# Patient Record
Sex: MALE | Race: WHITE | NOT HISPANIC OR LATINO | Employment: FULL TIME | URBAN - METROPOLITAN AREA
[De-identification: names, ages, dates, MRNs, and addresses within clinical notes are randomized per-mention and may not be internally consistent; named-entity substitution may affect disease eponyms.]

---

## 2018-01-01 ENCOUNTER — HOSPITAL ENCOUNTER (EMERGENCY)
Facility: CLINIC | Age: 23
Discharge: HOME | End: 2018-01-01
Attending: EMERGENCY MEDICINE | Admitting: EMERGENCY MEDICINE

## 2018-01-01 ENCOUNTER — APPOINTMENT (OUTPATIENT)
Dept: GENERAL RADIOLOGY | Facility: CLINIC | Age: 23
End: 2018-01-01
Attending: EMERGENCY MEDICINE

## 2018-01-01 VITALS
DIASTOLIC BLOOD PRESSURE: 92 MMHG | HEIGHT: 70 IN | WEIGHT: 170 LBS | OXYGEN SATURATION: 99 % | SYSTOLIC BLOOD PRESSURE: 136 MMHG | HEART RATE: 86 BPM | BODY MASS INDEX: 24.34 KG/M2 | TEMPERATURE: 100 F | RESPIRATION RATE: 16 BRPM

## 2018-01-01 DIAGNOSIS — S62.356A CLOSED NONDISPLACED FRACTURE OF SHAFT OF FIFTH METACARPAL BONE OF RIGHT HAND, INITIAL ENCOUNTER: ICD-10-CM

## 2018-01-01 PROCEDURE — 26600 TREAT METACARPAL FRACTURE: CPT | Mod: RT

## 2018-01-01 PROCEDURE — 99284 EMERGENCY DEPT VISIT MOD MDM: CPT | Mod: 25

## 2018-01-01 PROCEDURE — 73130 X-RAY EXAM OF HAND: CPT | Mod: RT

## 2018-01-01 ASSESSMENT — ENCOUNTER SYMPTOMS
WOUND: 0
MYALGIAS: 1
NECK STIFFNESS: 0
WEAKNESS: 0
DIZZINESS: 0
NECK PAIN: 0
JOINT SWELLING: 1
NUMBNESS: 0
ARTHRALGIAS: 1
BACK PAIN: 0
COLOR CHANGE: 0

## 2018-01-01 NOTE — ED PROVIDER NOTES
"  History     Chief Complaint:  Hand Injury    HPI   Cristobal Serna is a 22 year old right hand dominant male who presents with a hand injury.  The patient reports that he was working out yesterday, punching a punching bag, and in doing so injured his right hand.  He states that this pain came on specifically after one punch, and he stopped exercising after this pain occurred.  He states that he thought this pain would go away, but as the pain and swelling have increased over the past 24 hours, he decided to be evaluated today.  He has not taken anything for pain at home prior to arrival.  He denies any other injuries, open wounds, noticeable deformities, numbness, tingling, weakness, or any other associated symptoms.    Allergies:  No known drug allergies.    Medications:    The patient is currently on no regular medications.    Past Medical History:    No significant past medical history.     Past Surgical History:    No pertinent past surgical history.    Family History:    No pertinent family history.    Social History:  Smoking status: Former smoker  Alcohol use: Yes  Marital Status:  Single      Review of Systems   Musculoskeletal: Positive for arthralgias, joint swelling and myalgias. Negative for back pain, gait problem, neck pain and neck stiffness.   Skin: Negative for color change and wound.   Neurological: Negative for dizziness, weakness and numbness.   All other systems reviewed and are negative.    Physical Exam     Patient Vitals for the past 24 hrs:   BP Temp Temp src Pulse Resp SpO2 Height Weight   01/01/18 1047 (!) 136/92 100  F (37.8  C) Temporal 86 16 99 % 1.768 m (5' 9.6\") 77.1 kg (170 lb)         Physical Exam  Nursing note and vitals reviewed.  Constitutional:  Oriented to person, place, and time. Cooperative.   HENT:   Nose:    Nose normal.   Mouth/Throat:   Mucous membranes are normal.   Eyes:    Conjunctivae normal and EOM are normal.      Pupils are equal, round, and reactive to light. "   Neck:    Trachea normal.   Cardiovascular:  Normal rate, regular rhythm, normal heart sounds and normal pulses. No murmur heard.  Pulmonary/Chest:  Effort normal and breath sounds normal.   Abdominal:   Soft. Normal appearance and bowel sounds are normal.      There is no tenderness.      There is no rebound and no CVA tenderness.   Musculoskeletal:  Right hand swelling and tenderness present mainly over the 5th metacarpal. Tenderness with axial loading of the 5th digit as well. Extremities otherwise atraumatic.  Lymphadenopathy:  No cervical adenopathy.   Neurological:   Alert and oriented to person, place, and time. Normal strength.      No cranial nerve deficit or sensory deficit. GCS eye subscore is 4. GCS verbal subscore is 5. GCS motor subscore is 6.   Skin:    Skin is intact. No rash noted.   Psychiatric:   Normal mood and affect.    Emergency Department Course   Imaging:  Hand XR, G/E 3 views, right:  IMPRESSION: There is a minimally displaced fracture through the  proximal metaphysis of the right fifth metacarpal. No other fractures.  Report per radiology.    Procedures:   Splint Placement    PLACEMENT: Custom Ulnar Gutter splint was applied to the right upper extremity and after placement I checked and adjusted the fit to ensure proper positioning. The patient was more comfortable with the splint in place. Sensation and circulation are intact after splint placement.      Emergency Department Course:  Nursing notes and vitals reviewed.  (5715) I performed an exam of the patient as documented above.    The patient was sent for a x-ray while in the emergency department, findings above.   Patient was placed in a splint, see above note.    Findings and plan explained to the patient. Patient discharged home with instructions regarding supportive care, medications, and reasons to return. The importance of close follow-up was reviewed.   Impression & Plan    Medical Decision Making:  Cristobal Serna is a 22  year old male who came in with a right hand injury after punching a punching bag. He did not want anything for pain. He had x-rays obtained which showed above fracture. He was then placed in a plaster ulnar gutter splint. I will have him follow up with [facility]. I left a voicemail on the [facility] hotline with the patient's contact information.      Diagnosis:    ICD-10-CM   1. Closed nondisplaced fracture of shaft of fifth metacarpal bone of right hand, initial encounter S62.356A       Disposition:  Patient is discharged to home.      I, Michael Mcginnis, am serving as a scribe on 1/1/2018 at 10:56 AM to personally document services performed by Dr. Mendes based on my observations and the provider's statements to me.       Michael Mcginnis  1/1/2018    EMERGENCY DEPARTMENT       Anoop Mendes MD  01/01/18 9142

## 2018-01-01 NOTE — DISCHARGE INSTRUCTIONS
Closed Hand Fracture (Adult)  You have a fracture, or broken bone, in your hand. This may be a small crack or chip in the bone. Or it may be a major break with the broken parts pushed out of place. A closed fracture means that the broken bone has not gone through the skin. A hand fracture is treated with a splint or cast. It usually takes 4 to 6 weeks to heal. Severe injuries may require surgery.     Home care    Keep your arm elevated to reduce pain and swelling. When sitting or lying down, elevate your arm above the level of your heart. You can do this by placing your arm on a pillow that rests on your chest or on a pillow at your side. This is most important during the first 48 hours after injury.    Apply an ice pack over the injured area for no more than 15 to 20 minutes. Do this every 1 to 2 hours for the first 24 to 48 hours. Continue with ice packs as needed to ease pain and swelling. To make an ice pack, put ice cubes in a plastic bag that seals at the top. Wrap the bag in a clean, thin towel or cloth. Never put ice or an ice pack directly on the skin. You can place the ice pack inside the sling and directly over the cast or splint. As the ice melts, be careful that the cast or splint doesn t get wet.    Keep the cast or splint completely dry at all times. Bathe with your cast or splint out of the water, protected with 2 large plastic bags. Place 1 bag outside the other. Tape each bag with duct tape at the top end. If a fiberglass cast or splint gets wet, dry it with a hair dryer on a cool setting.    You may use over-the-counter pain medicine to control pain, unless another pain medicine was prescribed. If you have chronic liver or kidney disease or ever had a stomach ulcer or GI bleeding, talk with your provider beforeusing these medicines.  Follow-up care  Follow up with your healthcare provider within 1 week, or as advised. This is to be sure the bone is healing properly. If you were given a splint,  it may be changed to a cast at your follow-up visit.  If X-rays were taken, you will be told of any new findings that may affect your care.  When to seek medical advice  Call your healthcare provider right away if any of these occur:    The plaster cast or splint becomes wet or soft    The fiberglass cast or splint stays wet for more than 24 hours    The cast has a bad smell    The plaster cast or splint becomes loose    There is increased tightness or pain under the cast or splint    The fingers on your injured hand become swollen, cold, blue, numb, or tingly  Date Last Reviewed: 12/3/2015    7004-9440 The "[employer]. [address] [place] [address]. All rights reserved. This information is not intended as a substitute for professional medical care. Always follow your healthcare professional's instructions.

## 2018-01-01 NOTE — ED AVS SNAPSHOT
Emergency Department    [address] [place] [address]    Phone:  [phone]    Fax:  [phone]                                       Cristobal Serna   MRN: [id number]    Department:   Emergency Department   Date of Visit:  1/1/2018           After Visit Summary Signature Page     I have received my discharge instructions, and my questions have been answered. I have discussed any challenges I see with this plan with the nurse or doctor.    ..........................................................................................................................................  Patient/Patient Representative Signature      ..........................................................................................................................................  Patient Representative Print Name and Relationship to Patient    ..................................................               ................................................  Date                                            Time    ..........................................................................................................................................  Reviewed by Signature/Title    ...................................................              ..............................................  Date                                                            Time

## 2018-01-01 NOTE — LETTER
January 1, 2018      To Whom It May Concern:      Cristobal Serna was seen in our Emergency Department today, 01/01/18.  He is unable to use his right hand until cleared by orthopedic surgery.    Sincerely,        Anoop Mendes MD

## 2018-01-01 NOTE — ED AVS SNAPSHOT
Emergency Department    [address] [facility] [address]    Phone:  [phone]    Fax:  [phone]                                       Cristobal Serna   MRN: [id number]    Department:   Emergency Department   Date of Visit:  1/1/2018           Patient Information     Date Of Birth          1995        Your diagnoses for this visit were:     Closed nondisplaced fracture of shaft of fifth metacarpal bone of right hand, initial encounter        You were seen by Anoop Mendes MD.      Follow-up Information     Schedule an appointment as soon as possible for a visit with Addis John MD.    Specialty:  Orthopedics    Contact information:    [facility]  [address]  [facility] [address]  [phone]          Follow up with  Emergency Department.    Specialty:  EMERGENCY MEDICINE    Why:  If symptoms worsen    Contact information:    [address] [facility] [address] [phone]        Discharge Instructions         Closed Hand Fracture (Adult)  You have a fracture, or broken bone, in your hand. This may be a small crack or chip in the bone. Or it may be a major break with the broken parts pushed out of place. A closed fracture means that the broken bone has not gone through the skin. A hand fracture is treated with a splint or cast. It usually takes 4 to 6 weeks to heal. Severe injuries may require surgery.     Home care    Keep your arm elevated to reduce pain and swelling. When sitting or lying down, elevate your arm above the level of your heart. You can do this by placing your arm on a pillow that rests on your chest or on a pillow at your side. This is most important during the first 48 hours after injury.    Apply an ice pack over the injured area for no more than 15 to 20 minutes. Do this every 1 to 2 hours for the first 24 to 48 hours. Continue with ice packs as needed to ease pain and swelling. To make an ice pack, put ice  cubes in a plastic bag that seals at the top. Wrap the bag in a clean, thin towel or cloth. Never put ice or an ice pack directly on the skin. You can place the ice pack inside the sling and directly over the cast or splint. As the ice melts, be careful that the cast or splint doesn t get wet.    Keep the cast or splint completely dry at all times. Bathe with your cast or splint out of the water, protected with 2 large plastic bags. Place 1 bag outside the other. Tape each bag with duct tape at the top end. If a fiberglass cast or splint gets wet, dry it with a hair dryer on a cool setting.    You may use over-the-counter pain medicine to control pain, unless another pain medicine was prescribed. If you have chronic liver or kidney disease or ever had a stomach ulcer or GI bleeding, talk with your provider beforeusing these medicines.  Follow-up care  Follow up with your healthcare provider within 1 week, or as advised. This is to be sure the bone is healing properly. If you were given a splint, it may be changed to a cast at your follow-up visit.  If X-rays were taken, you will be told of any new findings that may affect your care.  When to seek medical advice  Call your healthcare provider right away if any of these occur:    The plaster cast or splint becomes wet or soft    The fiberglass cast or splint stays wet for more than 24 hours    The cast has a bad smell    The plaster cast or splint becomes loose    There is increased tightness or pain under the cast or splint    The fingers on your injured hand become swollen, cold, blue, numb, or tingly  Date Last Reviewed: 12/3/2015    0854-5030 [employer]. [address] [place] [address], [place] [address]. All rights reserved. This information is not intended as a substitute for professional medical care. Always follow your healthcare professional's instructions.          24 Hour Appointment Hotline       To make an appointment at any [facility], call  [phone] ([phone]). If you don't have a family doctor or clinic, we will help you find one. [facility] clinics are conveniently located to serve the needs of you and your family.             Review of your medicines      Notice     You have not been prescribed any medications.            Procedures and tests performed during your visit     Hand XR, G/E 3 views, right      Orders Needing Specimen Collection     None      Pending Results     Date and Time Order Name Status Description    1/1/2018 1058 Hand XR, G/E 3 views, right Preliminary             Pending Culture Results     No orders found from 12/30/2017 to 1/2/2018.            Pending Results Instructions     If you had any lab results that were not finalized at the time of your Discharge, you can call the ED Lab Result RN at [phone]. You will be contacted by this team for any positive Lab results or changes in treatment. The nurses are available 7 days a week from 10A to 6:30P.  You can leave a message 24 hours per day and they will return your call.        Test Results From Your Hospital Stay        1/1/2018 11:22 AM      Narrative     HAND RIGHT THREE OR MORE VIEWS January 1, 2018 11:18 AM     HISTORY: Injury.    COMPARISON: None.        Impression     IMPRESSION: There is a minimally displaced fracture through the  proximal metaphysis of the right fifth metacarpal. No other fractures.                Clinical Quality Measure: Blood Pressure Screening     Your blood pressure was checked while you were in the emergency department today. The last reading we obtained was  BP: (!) 136/92 . Please read the guidelines below about what these numbers mean and what you should do about them.  If your systolic blood pressure (the top number) is less than 120 and your diastolic blood pressure (the bottom number) is less than 80, then your blood pressure is normal. There is nothing more that you need to do about it.  If your systolic blood pressure (the  "top number) is 120-139 or your diastolic blood pressure (the bottom number) is 80-89, your blood pressure may be higher than it should be. You should have your blood pressure rechecked within a year by a primary care provider.  If your systolic blood pressure (the top number) is 140 or greater or your diastolic blood pressure (the bottom number) is 90 or greater, you may have high blood pressure. High blood pressure is treatable, but if left untreated over time it can put you at risk for heart attack, stroke, or kidney failure. You should have your blood pressure rechecked by a primary care provider within the next 4 weeks.  If your provider in the emergency department today gave you specific instructions to follow-up with your doctor or provider even sooner than that, you should follow that instruction and not wait for up to 4 weeks for your follow-up visit.        Thank you for choosing [facility]       Thank you for choosing [facility] for your care. Our goal is always to provide you with excellent care. Hearing back from our patients is one way we can continue to improve our services. Please take a few minutes to complete the written survey that you may receive in the mail after you visit with us. Thank you!        [employer] Information     [employer] lets you send messages to your doctor, view your test results, renew your prescriptions, schedule appointments and more. To sign up, go to [email or web address] . Click on \"Log in\" on the left side of the screen, which will take you to the Welcome page. Then click on \"Sign up Now\" on the right side of the page.     You will be asked to enter the access code listed below, as well as some personal information. Please follow the directions to create your username and password.     Your access code is: [email or web address]  Expires: 2018 12:18 PM     Your access code will  in 90 days. If you need help or a new code, please call your [facility] clinic or [phone].      "   Care EveryWhere ID     This is your Care EveryWhere ID. This could be used by other organizations to access your [facility] medical records  [id number]        Equal Access to Services     JOSE ALFREDO BAE : Isabelle Tafoya, nura landrum, nicky granados, margie gentile. So [facility] [phone].    ATENCIÓN: Si habla español, tiene a rivera disposición servicios gratuitos de asistencia lingüística. Llame al [phone].    We comply with applicable federal civil rights laws and [place] laws. We do not discriminate on the basis of race, color, national origin, age, disability, sex, sexual orientation, or gender identity.            After Visit Summary       This is your record. Keep this with you and show to your community pharmacist(s) and doctor(s) at your next visit.

## 2023-12-19 ENCOUNTER — OFFICE VISIT (OUTPATIENT)
Dept: URGENT CARE | Facility: URGENT CARE | Age: 28
End: 2023-12-19
Payer: COMMERCIAL

## 2023-12-19 VITALS
RESPIRATION RATE: 14 BRPM | OXYGEN SATURATION: 99 % | HEART RATE: 82 BPM | DIASTOLIC BLOOD PRESSURE: 90 MMHG | BODY MASS INDEX: 27.52 KG/M2 | WEIGHT: 189.6 LBS | SYSTOLIC BLOOD PRESSURE: 146 MMHG | TEMPERATURE: 98.5 F

## 2023-12-19 DIAGNOSIS — R07.0 THROAT PAIN: Primary | ICD-10-CM

## 2023-12-19 LAB
DEPRECATED S PYO AG THROAT QL EIA: NEGATIVE
GROUP A STREP BY PCR: NOT DETECTED

## 2023-12-19 PROCEDURE — 99203 OFFICE O/P NEW LOW 30 MIN: CPT

## 2023-12-19 PROCEDURE — 87651 STREP A DNA AMP PROBE: CPT

## 2023-12-19 NOTE — PROGRESS NOTES
ASSESSMENT:   (R07.0) Throat pain  (primary encounter diagnosis)  Plan: Streptococcus A Rapid Screen w/Reflex to PCR -         Clinic Collect, Group A Streptococcus PCR         Throat Swab    PLAN:  Informed the patient that the strep test is negative for strep throat.  We discussed the need to get plenty of rest, drink fluids and use Tylenol and or ibuprofen as needed for pain with a maximum dose of Tylenol being 4000 mg in a 24-hour period of time and to take ibuprofen with food to avoid upset stomach.  We also discussed trying warm salt water gargles, hot/warm water or tea with honey and/or lemon and/or Cepacol lozenges or spray for the sore throat.  Discussed the need to return to clinic with any new or worsening symptoms.  Patient acknowledged their understanding of the above plan.    The use of [employer] services may have been used to construct the content in this note; any grammatical or spelling errors are non-intentional. Please contact the author of this note directly if you are in need of any clarification.      Bo Ambrose, CHAZ CNP      SUBJECTIVE:   Cristobal Serna Jr.  is a 28 year old male who is here today because of: Sore Throat.  The patient has had symptoms of nasal congestion/runny nose.   Onset of symptoms was 1 day ago. Course of illness is worsening for the throat and nasal congestion is better.  Patient unsure of exposure to illness at home.   Patient denies fever, cough, earache, vomiting, and diarrhea  Treatment measures tried include ibuprofen and warm salt water gargles.    ROS:  Negative except noted above.      OBJECTIVE:   BP (!) 146/90   Pulse 82   Temp 98.5  F (36.9  C) (Tympanic)   Resp 14   Wt 86 kg (189 lb 9.6 oz)   SpO2 99%   BMI 27.52 kg/m    General: healthy, alert and no distress  Eyes - conjunctivae clear.  Nose/Sinuses - Nares normal.Mucosa normal. No drainage or sinus tenderness.  Oropharynx - Lips, mucosa, and tongue normal. Positive  findings: oropharyngeal erythema, tonsillar  Neck - Neck supple; no lymphadenopathy  Lungs - Lungs clear; no wheezing or rales.  Heart - regular rate and rhythm. No murmurs, rub.    Labs:  Rapid Strep test is negative; await throat culture results.